# Patient Record
(demographics unavailable — no encounter records)

---

## 2024-11-05 NOTE — PROCEDURE
[Cervical Pap Smear] : cervical Pap smear [Liquid Base] : liquid base [Endometrial Biopsy] : Endometrial biopsy [Irregular Bleeding] : irregular uterine bleeding [Post-Menop. Bleeding] : post-menopausal bleeding [Infection] : infection [Bleeding] : bleeding [Allergic Reaction] : allergic reaction [Uterine Perforation] : uterine perforation [Pain] : pain [No Premedication] : No premedication [None] : none [Easy Passage] : Easy passage [Sounded to ___ cm] : sounded to [unfilled] ~Ucm [Mid Position] : mid position [Moderate] : moderate [Specimen Collected] : collected [Sent to Pathology] : placed in buffered formalin and sent for pathology [IUD Removal] : intrauterine device (IUD) removal [Time out performed] : Pre-procedure time out performed.  Patient's name, date of birth and procedure confirmed. [Consent Obtained] : Consent obtained [ IUD] :  IUD [Risks] : risks [Benefits] : benefits [Alternatives] : alternatives [Patient] : patient [Speculum Placed] : speculum placed [IUD Removed - Forceps] : IUD removed - forceps [Cultured] : specimen sent for cultures [Tolerated Well] : Patient tolerated the procedure well [No Complications] : no complications [de-identified] : Milex 4 mm

## 2024-11-05 NOTE — HISTORY OF PRESENT ILLNESS
[IUD] : has an intrauterine device [N] : Patient is not sexually active [Y] : Positive pregnancy history [Menarche Age: ____] : age at menarche was [unfilled] [Previously active] : previously active [TextBox_31] : 10-15 years ago - as per pt [LMPDate] : 6/2020 [de-identified] : Mirena -  7-10 years ago [PGHxTotal] : 3 [facility] [[facility]] : 3 [FreeTextEntry1] : 6/2020

## 2024-11-05 NOTE — PROCEDURE
[Cervical Pap Smear] : cervical Pap smear [Liquid Base] : liquid base [Endometrial Biopsy] : Endometrial biopsy [Irregular Bleeding] : irregular uterine bleeding [Post-Menop. Bleeding] : post-menopausal bleeding [Infection] : infection [Bleeding] : bleeding [Allergic Reaction] : allergic reaction [Uterine Perforation] : uterine perforation [Pain] : pain [No Premedication] : No premedication [None] : none [Easy Passage] : Easy passage [Sounded to ___ cm] : sounded to [unfilled] ~Ucm [Mid Position] : mid position [Moderate] : moderate [Specimen Collected] : collected [Sent to Pathology] : placed in buffered formalin and sent for pathology [IUD Removal] : intrauterine device (IUD) removal [Time out performed] : Pre-procedure time out performed.  Patient's name, date of birth and procedure confirmed. [Consent Obtained] : Consent obtained [ IUD] :  IUD [Risks] : risks [Benefits] : benefits [Alternatives] : alternatives [Patient] : patient [Speculum Placed] : speculum placed [IUD Removed - Forceps] : IUD removed - forceps [Cultured] : specimen sent for cultures [Tolerated Well] : Patient tolerated the procedure well [No Complications] : no complications [de-identified] : Milex 4 mm

## 2024-11-05 NOTE — PHYSICAL EXAM
[Chaperone Present] : A chaperone was present in the examining room during all aspects of the physical examination [[id number]] : A chaperone was present during the pelvic exam. [Appropriately responsive] : appropriately responsive [Alert] : alert [No Acute Distress] : no acute distress [No Lymphadenopathy] : no lymphadenopathy [Regular Rate Rhythm] : regular rate rhythm [No Murmurs] : no murmurs [Clear to Auscultation B/L] : clear to auscultation bilaterally [Soft] : soft [Non-tender] : non-tender [Non-distended] : non-distended [No Lesions] : no lesions [No Mass] : no mass [Oriented x3] : oriented x3 [Examination Of The Breasts] : a normal appearance [No Masses] : no breast masses were palpable [Labia Majora] : normal [Labia Minora] : normal [Discharge] : discharge [Scant] : scant [Brown] : brown [Blood-Tinged] : blood-tinged [IUD String] : an IUD string was noted [Normal] : normal [No Tenderness] : no tenderness [Uterine Adnexae] : normal [Nl Sphincter Tone] : normal sphincter tone [FreeTextEntry2] : Stella PATEL [Foul Smelling] : not foul smelling [FreeTextEntry9] : heme negative

## 2024-11-05 NOTE — PLAN
[FreeTextEntry1] : -essentially normal exam -we discussed the issues and the significance of postmenopausal bleeding. I assume she is in menopause as the Mirena has been exhausted for quite some time.  -the IUD was easily removed, appeared intact and was sent for culture. -an endometrial biopsy was collected. I used a pelvic model and atlas to review the anatomy and the possible areas of concern -she will go for a screening mammogram and a DXA/VFA She will RTO in 2-3 weeks for a review of all results.   During this visit 40 minutes were spent face-to-face with greater than 50% of the time dedicated to counseling.

## 2024-11-05 NOTE — PROCEDURE
[Cervical Pap Smear] : cervical Pap smear [Liquid Base] : liquid base [Endometrial Biopsy] : Endometrial biopsy [Irregular Bleeding] : irregular uterine bleeding [Post-Menop. Bleeding] : post-menopausal bleeding [Infection] : infection [Bleeding] : bleeding [Allergic Reaction] : allergic reaction [Uterine Perforation] : uterine perforation [Pain] : pain [No Premedication] : No premedication [None] : none [Easy Passage] : Easy passage [Sounded to ___ cm] : sounded to [unfilled] ~Ucm [Mid Position] : mid position [Moderate] : moderate [Specimen Collected] : collected [Sent to Pathology] : placed in buffered formalin and sent for pathology [IUD Removal] : intrauterine device (IUD) removal [Time out performed] : Pre-procedure time out performed.  Patient's name, date of birth and procedure confirmed. [Consent Obtained] : Consent obtained [ IUD] :  IUD [Risks] : risks [Benefits] : benefits [Alternatives] : alternatives [Patient] : patient [Speculum Placed] : speculum placed [IUD Removed - Forceps] : IUD removed - forceps [Cultured] : specimen sent for cultures [Tolerated Well] : Patient tolerated the procedure well [No Complications] : no complications [de-identified] : Milex 4 mm

## 2024-11-05 NOTE — HISTORY OF PRESENT ILLNESS
[IUD] : has an intrauterine device [N] : Patient is not sexually active [Y] : Positive pregnancy history [Menarche Age: ____] : age at menarche was [unfilled] [Previously active] : previously active [TextBox_31] : 10-15 years ago - as per pt [LMPDate] : 6/2020 [de-identified] : Mirena -  7-10 years ago [PGHxTotal] : 3 [[facility] [[facility]] : 3 [FreeTextEntry1] : 6/2020

## 2024-11-05 NOTE — HISTORY OF PRESENT ILLNESS
[IUD] : has an intrauterine device [N] : Patient is not sexually active [Y] : Positive pregnancy history [Menarche Age: ____] : age at menarche was [unfilled] [Previously active] : previously active [TextBox_31] : 10-15 years ago - as per pt [LMPDate] : 6/2020 [de-identified] : Mirena -  7-10 years ago [PGHxTotal] : 3 [[facility] CenterlTerm] : 3 [[facility]iving] : 3 [FreeTextEntry1] : 6/2020

## 2024-11-05 NOTE — PHYSICAL EXAM
[Chaperone Present] : A chaperone was present in the examining room during all aspects of the physical examination [[id number]] : A chaperone was present during the pelvic exam. [Appropriately responsive] : appropriately responsive [Alert] : alert [No Acute Distress] : no acute distress [No Lymphadenopathy] : no lymphadenopathy [Regular Rate Rhythm] : regular rate rhythm [No Murmurs] : no murmurs [Clear to Auscultation B/L] : clear to auscultation bilaterally [Soft] : soft [Non-tender] : non-tender [Non-distended] : non-distended [No Lesions] : no lesions [No Mass] : no mass [Oriented x3] : oriented x3 [Examination Of The Breasts] : a normal appearance [No Masses] : no breast masses were palpable [Labia Majora] : normal [Labia Minora] : normal [Discharge] : discharge [Scant] : scant [Brown] : brown [Blood-Tinged] : blood-tinged [IUD String] : an IUD string was noted [Normal] : normal [No Tenderness] : no tenderness [Uterine Adnexae] : normal [Nl Sphincter Tone] : normal sphincter tone [FreeTextEntry2] : Stella PATEL [Foul Smelling] : not foul smelling [FreeTextEntry9] : heme negative

## 2024-11-05 NOTE — PHYSICAL EXAM
[Chaperone Present] : A chaperone was present in the examining room during all aspects of the physical examination [[id number]] : A chaperone was present during the pelvic exam. [Appropriately responsive] : appropriately responsive [Alert] : alert [No Acute Distress] : no acute distress [No Lymphadenopathy] : no lymphadenopathy [Regular Rate Rhythm] : regular rate rhythm [No Murmurs] : no murmurs [Clear to Auscultation B/L] : clear to auscultation bilaterally [Soft] : soft [Non-tender] : non-tender [Non-distended] : non-distended [No Lesions] : no lesions [No Mass] : no mass [Oriented x3] : oriented x3 [Examination Of The Breasts] : a normal appearance [No Masses] : no breast masses were palpable [Labia Majora] : normal [Labia Minora] : normal [Discharge] : discharge [Scant] : scant [Brown] : brown [Blood-Tinged] : blood-tinged [IUD String] : an IUD string was noted [Normal] : normal [No Tenderness] : no tenderness [Uterine Adnexae] : normal [Nl Sphincter Tone] : normal sphincter tone [FreeTextEntry2] : Stella PATEL [Foul Smelling] : not foul smelling [FreeTextEntry9] : heme negative
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
## 2025-06-30 NOTE — IMAGING
[de-identified] : X-ray Ap/Lateral/Flexion/Extension of lumbar spine were viewed and interpreted. scoliosis. multilevel  disc degeneration worst a [id number] and [id number].  no instability on flexion or extension views

## 2025-06-30 NOTE — ASSESSMENT
[FreeTextEntry1] : 52 year old female with chronic low back pain PT multilevel  disc degeneration with central and foraminal stenosis Pain management referral for NEW